# Patient Record
Sex: FEMALE | Race: ASIAN | Employment: UNEMPLOYED | URBAN - METROPOLITAN AREA
[De-identification: names, ages, dates, MRNs, and addresses within clinical notes are randomized per-mention and may not be internally consistent; named-entity substitution may affect disease eponyms.]

---

## 2021-03-17 ENCOUNTER — ANESTHESIA EVENT (OUTPATIENT)
Dept: OBGYN UNIT | Facility: HOSPITAL | Age: 31
End: 2021-03-17
Payer: COMMERCIAL

## 2021-03-17 ENCOUNTER — HOSPITAL ENCOUNTER (INPATIENT)
Facility: HOSPITAL | Age: 31
LOS: 3 days | Discharge: HOME | End: 2021-03-20
Attending: OBSTETRICS & GYNECOLOGY | Admitting: OBSTETRICS & GYNECOLOGY
Payer: COMMERCIAL

## 2021-03-17 ENCOUNTER — ANESTHESIA (OUTPATIENT)
Dept: OBGYN UNIT | Facility: HOSPITAL | Age: 31
End: 2021-03-17
Payer: COMMERCIAL

## 2021-03-17 PROBLEM — Z34.90 PREGNANCY (HCC): Status: ACTIVE | Noted: 2021-03-17

## 2021-03-17 PROBLEM — Z34.90 PREGNANCY: Status: ACTIVE | Noted: 2021-03-17

## 2021-03-17 LAB
ANTIBODY SCREEN: NEGATIVE
BASOPHILS # BLD AUTO: 0.05 X10(3) UL (ref 0–0.2)
BASOPHILS NFR BLD AUTO: 0.4 %
DEPRECATED RDW RBC AUTO: 44.8 FL (ref 35.1–46.3)
EOSINOPHIL # BLD AUTO: 0.21 X10(3) UL (ref 0–0.7)
EOSINOPHIL NFR BLD AUTO: 1.6 %
ERYTHROCYTE [DISTWIDTH] IN BLOOD BY AUTOMATED COUNT: 14.3 % (ref 11–15)
HCT VFR BLD AUTO: 36.6 %
HGB BLD-MCNC: 12.6 G/DL
IMM GRANULOCYTES # BLD AUTO: 0.41 X10(3) UL (ref 0–1)
IMM GRANULOCYTES NFR BLD: 3.1 %
LYMPHOCYTES # BLD AUTO: 1.74 X10(3) UL (ref 1–4)
LYMPHOCYTES NFR BLD AUTO: 13.3 %
MCH RBC QN AUTO: 30.2 PG (ref 26–34)
MCHC RBC AUTO-ENTMCNC: 34.4 G/DL (ref 31–37)
MCV RBC AUTO: 87.8 FL
MONOCYTES # BLD AUTO: 0.9 X10(3) UL (ref 0.1–1)
MONOCYTES NFR BLD AUTO: 6.9 %
NEUTROPHILS # BLD AUTO: 9.75 X10 (3) UL (ref 1.5–7.7)
NEUTROPHILS # BLD AUTO: 9.75 X10(3) UL (ref 1.5–7.7)
NEUTROPHILS NFR BLD AUTO: 74.7 %
PLATELET # BLD AUTO: 230 10(3)UL (ref 150–450)
RBC # BLD AUTO: 4.17 X10(6)UL
RH BLOOD TYPE: POSITIVE
SARS-COV-2 RNA RESP QL NAA+PROBE: NOT DETECTED
T PALLIDUM AB SER QL IA: NONREACTIVE
WBC # BLD AUTO: 13.1 X10(3) UL (ref 4–11)

## 2021-03-17 PROCEDURE — 85025 COMPLETE CBC W/AUTO DIFF WBC: CPT | Performed by: OBSTETRICS & GYNECOLOGY

## 2021-03-17 PROCEDURE — 86850 RBC ANTIBODY SCREEN: CPT | Performed by: OBSTETRICS & GYNECOLOGY

## 2021-03-17 PROCEDURE — 86901 BLOOD TYPING SEROLOGIC RH(D): CPT | Performed by: OBSTETRICS & GYNECOLOGY

## 2021-03-17 PROCEDURE — 99204 OFFICE O/P NEW MOD 45 MIN: CPT

## 2021-03-17 PROCEDURE — 86780 TREPONEMA PALLIDUM: CPT | Performed by: OBSTETRICS & GYNECOLOGY

## 2021-03-17 PROCEDURE — 86900 BLOOD TYPING SEROLOGIC ABO: CPT | Performed by: OBSTETRICS & GYNECOLOGY

## 2021-03-17 RX ORDER — TERBUTALINE SULFATE 1 MG/ML
0.25 INJECTION, SOLUTION SUBCUTANEOUS AS NEEDED
Status: DISCONTINUED | OUTPATIENT
Start: 2021-03-17 | End: 2021-03-18

## 2021-03-17 RX ORDER — DEXTROSE, SODIUM CHLORIDE, SODIUM LACTATE, POTASSIUM CHLORIDE, AND CALCIUM CHLORIDE 5; .6; .31; .03; .02 G/100ML; G/100ML; G/100ML; G/100ML; G/100ML
INJECTION, SOLUTION INTRAVENOUS CONTINUOUS
Status: DISCONTINUED | OUTPATIENT
Start: 2021-03-17 | End: 2021-03-18

## 2021-03-17 RX ORDER — VANCOMYCIN HYDROCHLORIDE
20 EVERY 8 HOURS
Status: DISCONTINUED | OUTPATIENT
Start: 2021-03-17 | End: 2021-03-18

## 2021-03-17 RX ORDER — ONDANSETRON 2 MG/ML
4 INJECTION INTRAMUSCULAR; INTRAVENOUS EVERY 6 HOURS PRN
Status: DISCONTINUED | OUTPATIENT
Start: 2021-03-17 | End: 2021-03-18

## 2021-03-17 RX ORDER — TRISODIUM CITRATE DIHYDRATE AND CITRIC ACID MONOHYDRATE 500; 334 MG/5ML; MG/5ML
30 SOLUTION ORAL AS NEEDED
Status: DISCONTINUED | OUTPATIENT
Start: 2021-03-17 | End: 2021-03-18

## 2021-03-17 RX ORDER — SODIUM CHLORIDE, SODIUM LACTATE, POTASSIUM CHLORIDE, CALCIUM CHLORIDE 600; 310; 30; 20 MG/100ML; MG/100ML; MG/100ML; MG/100ML
INJECTION, SOLUTION INTRAVENOUS AS NEEDED
Status: DISCONTINUED | OUTPATIENT
Start: 2021-03-17 | End: 2021-03-18

## 2021-03-17 RX ORDER — AMMONIA INHALANTS 0.04 G/.3ML
0.3 INHALANT RESPIRATORY (INHALATION) AS NEEDED
Status: DISCONTINUED | OUTPATIENT
Start: 2021-03-17 | End: 2021-03-18

## 2021-03-17 RX ORDER — IBUPROFEN 600 MG/1
600 TABLET ORAL EVERY 6 HOURS PRN
Status: DISCONTINUED | OUTPATIENT
Start: 2021-03-17 | End: 2021-03-18

## 2021-03-17 RX ORDER — ACETAMINOPHEN 500 MG
500 TABLET ORAL EVERY 6 HOURS PRN
Status: DISCONTINUED | OUTPATIENT
Start: 2021-03-17 | End: 2021-03-18

## 2021-03-17 RX ORDER — NALBUPHINE HCL 10 MG/ML
2.5 AMPUL (ML) INJECTION
Status: DISCONTINUED | OUTPATIENT
Start: 2021-03-17 | End: 2021-03-18

## 2021-03-17 RX ORDER — BUPIVACAINE HCL/0.9 % NACL/PF 0.25 %
5 PLASTIC BAG, INJECTION (ML) EPIDURAL AS NEEDED
Status: DISCONTINUED | OUTPATIENT
Start: 2021-03-17 | End: 2021-03-18

## 2021-03-17 NOTE — PROGRESS NOTES
Pt is a 27year old female admitted to TRG2/TRG2-A. Patient presents with:  R/o Labor     Pt is  38w0d intra-uterine pregnancy. History obtained, consents signed. Oriented to room, staff, and plan of care.

## 2021-03-18 PROBLEM — Z34.90 PREGNANT: Status: ACTIVE | Noted: 2021-03-18

## 2021-03-18 PROBLEM — Z34.90 PREGNANT (HCC): Status: ACTIVE | Noted: 2021-03-18

## 2021-03-18 PROCEDURE — 10907ZC DRAINAGE OF AMNIOTIC FLUID, THERAPEUTIC FROM PRODUCTS OF CONCEPTION, VIA NATURAL OR ARTIFICIAL OPENING: ICD-10-PCS | Performed by: OBSTETRICS & GYNECOLOGY

## 2021-03-18 PROCEDURE — 0KQM0ZZ REPAIR PERINEUM MUSCLE, OPEN APPROACH: ICD-10-PCS | Performed by: OBSTETRICS & GYNECOLOGY

## 2021-03-18 RX ORDER — BISACODYL 10 MG
10 SUPPOSITORY, RECTAL RECTAL ONCE AS NEEDED
Status: DISCONTINUED | OUTPATIENT
Start: 2021-03-18 | End: 2021-03-20

## 2021-03-18 RX ORDER — ACETAMINOPHEN 325 MG/1
650 TABLET ORAL EVERY 6 HOURS PRN
Status: DISCONTINUED | OUTPATIENT
Start: 2021-03-18 | End: 2021-03-20

## 2021-03-18 RX ORDER — SIMETHICONE 80 MG
80 TABLET,CHEWABLE ORAL 3 TIMES DAILY PRN
Status: DISCONTINUED | OUTPATIENT
Start: 2021-03-18 | End: 2021-03-20

## 2021-03-18 RX ORDER — DOCUSATE SODIUM 100 MG/1
100 CAPSULE, LIQUID FILLED ORAL
Status: DISCONTINUED | OUTPATIENT
Start: 2021-03-18 | End: 2021-03-20

## 2021-03-18 RX ORDER — IBUPROFEN 600 MG/1
600 TABLET ORAL EVERY 6 HOURS
Status: DISCONTINUED | OUTPATIENT
Start: 2021-03-18 | End: 2021-03-20

## 2021-03-18 NOTE — H&P
Pt is 28 y/o G1 at 38w1d who presented with c/o ctx's - was found to be 3 cm dilated - admitted for IV ABX for + GBS - then given epidural    PMH - 1 hour - nl              + gBS  /68   Pulse 90   Resp 16   Ht 5' (1.524 m)   Wt 151 lb (68.5 kg)   LMP

## 2021-03-18 NOTE — PROGRESS NOTES
Patient up to bathroom with assist x 1. Unable to void at this time, straight cath for 300 ml of urine per RN. Patient transferred to mother/baby room 1116 per wheelchair in stable condition with baby and personal belongings.   Accompanied by significant ot

## 2021-03-18 NOTE — PLAN OF CARE
Problem: BIRTH - VAGINAL/ SECTION  Goal: Fetal and maternal status remain reassuring during the birth process  Description: INTERVENTIONS:  - Monitor vital signs  - Monitor fetal heart rate  - Monitor uterine activity  - Monitor labor progression less  Multidisciplinary care   Nonpharmacologic comfort measures       Outcome: Completed

## 2021-03-18 NOTE — CONSULTS
[facility] Dosing Service    Vancomycin for GBS (+) patient  Janina Gallardo is a 27year old patient who has been prescribed vancomycin 20mg/kg IV every 8 hours for GBS prophylaxis.   Pharmacy will monitor and will order vancomycin levels if medication is

## 2021-03-18 NOTE — L&D DELIVERY NOTE
Anibal Mauro, Girl [[id number]]    Labor Events     labor?: No   steroids?: None  Antibiotics received during labor?: Yes  Antibiotics (enter # doses in comment): other (Comment: vancomycin x1)  Rupture date/time: 3/17/2021 2218     Rupture type: A Absent <100 bpm >100 bpm    Reflex irritability No response Grimace Cry or active withdrawal    Muscle tone Limp Some flexion Active motion    Respiratory effort Absent Weak cry; hypoventilation Good, crying              1 Minute:  5 Minute:  10 Minute:  1

## 2021-03-18 NOTE — PROGRESS NOTES
Pt now pushing - labored down - now +2/+3 - head visible with pushing  FHT's - moderate variability - small variables  A/P Baby stable - allow to push

## 2021-03-18 NOTE — ANESTHESIA PREPROCEDURE EVALUATION
PRE-OP EVALUATION    Patient Name: No Luis    Pre-op Diagnosis: * No surgery found *    * No surgery found *    * Surgery not found *    Pre-op vitals reviewed. Pulse: 96  Resp: 16  BP: 127/78     Body mass index is 29.49 kg/m².     Current medicatio cardiovascular ROS. Endo/Other    Negative endo/other ROS. Pulmonary    Negative pulmonary ROS. Neuro/Psych    Negative neuro/psych ROS.

## 2021-03-19 LAB
BASOPHILS # BLD AUTO: 0.06 X10(3) UL (ref 0–0.2)
BASOPHILS NFR BLD AUTO: 0.4 %
DEPRECATED RDW RBC AUTO: 47.2 FL (ref 35.1–46.3)
EOSINOPHIL # BLD AUTO: 0.44 X10(3) UL (ref 0–0.7)
EOSINOPHIL NFR BLD AUTO: 3.3 %
ERYTHROCYTE [DISTWIDTH] IN BLOOD BY AUTOMATED COUNT: 14.6 % (ref 11–15)
HCT VFR BLD AUTO: 26.5 %
HGB BLD-MCNC: 8.7 G/DL
IMM GRANULOCYTES # BLD AUTO: 0.31 X10(3) UL (ref 0–1)
IMM GRANULOCYTES NFR BLD: 2.3 %
LYMPHOCYTES # BLD AUTO: 2.16 X10(3) UL (ref 1–4)
LYMPHOCYTES NFR BLD AUTO: 16.2 %
MCH RBC QN AUTO: 29.7 PG (ref 26–34)
MCHC RBC AUTO-ENTMCNC: 32.8 G/DL (ref 31–37)
MCV RBC AUTO: 90.4 FL
MONOCYTES # BLD AUTO: 0.99 X10(3) UL (ref 0.1–1)
MONOCYTES NFR BLD AUTO: 7.4 %
NEUTROPHILS # BLD AUTO: 9.4 X10 (3) UL (ref 1.5–7.7)
NEUTROPHILS # BLD AUTO: 9.4 X10(3) UL (ref 1.5–7.7)
NEUTROPHILS NFR BLD AUTO: 70.4 %
PLATELET # BLD AUTO: 159 10(3)UL (ref 150–450)
RBC # BLD AUTO: 2.93 X10(6)UL
WBC # BLD AUTO: 13.4 X10(3) UL (ref 4–11)

## 2021-03-19 PROCEDURE — 85025 COMPLETE CBC W/AUTO DIFF WBC: CPT | Performed by: OBSTETRICS & GYNECOLOGY

## 2021-03-19 NOTE — PROGRESS NOTES
OB Progress Note PPD#1    S: Feels well. Ambulating, eating. Pain controlled. Moderate VB. Breastfeeding. Voiding without difficulty, + flatus, no BM  O:   Blood pressure 93/56, pulse 79, temperature 97.7 °F (36.5 °C), temperature source Oral, resp.  rate

## 2021-03-19 NOTE — PROGRESS NOTES
Labor Analgesia Follow Up Note    Patient underwent epidural anesthesia for labor analgesia,    Placenta Date/Time: 3/18/2021  2:35 AM    Delivery Date/Time[de-identified] 3/18/2021  2:31 AM    BP 93/56 (BP Location: Right arm)   Pulse 79   Temp 97.7 °F (36.5 °C) (Oral

## 2021-03-20 VITALS
HEIGHT: 60 IN | RESPIRATION RATE: 18 BRPM | DIASTOLIC BLOOD PRESSURE: 59 MMHG | HEART RATE: 89 BPM | BODY MASS INDEX: 29.64 KG/M2 | SYSTOLIC BLOOD PRESSURE: 107 MMHG | WEIGHT: 151 LBS | TEMPERATURE: 98 F | OXYGEN SATURATION: 98 %

## 2021-03-20 RX ORDER — IBUPROFEN 600 MG/1
600 TABLET ORAL EVERY 6 HOURS PRN
Qty: 40 TABLET | Refills: 0 | Status: SHIPPED | OUTPATIENT
Start: 2021-03-20 | End: 2021-04-29 | Stop reason: ALTCHOICE

## 2021-03-20 NOTE — PROGRESS NOTES
OB Progress Note PPD#2    S: Feels well. Ambulating, eating. Pain controlled. Moderate VB. Breastfeeding. Voiding without difficulty, + flatus, + BM  O:   Blood pressure 107/59, pulse 89, temperature 97.5 °F (36.4 °C), temperature source Oral, resp.  rate

## 2021-03-22 ENCOUNTER — TELEPHONE (OUTPATIENT)
Dept: OBGYN UNIT | Facility: HOSPITAL | Age: 31
End: 2021-03-22

## 2021-03-22 NOTE — PROGRESS NOTES
03/22/21 1108   Cradle Call Follow up   Cradle call follow up date 03/22/21   Follow up needed Completed   Hypertension or Preeclampsia during pregnancy or delivery No   Outgoing Cradle Call completed: Mom reports that she and infant are doing well.

## 2021-05-21 ENCOUNTER — NURSE ONLY (OUTPATIENT)
Dept: LACTATION | Facility: HOSPITAL | Age: 31
End: 2021-05-21
Attending: OBSTETRICS & GYNECOLOGY
Payer: COMMERCIAL

## 2021-05-21 VITALS — TEMPERATURE: 98 F

## 2021-05-21 PROCEDURE — 99213 OFFICE O/P EST LOW 20 MIN: CPT

## 2021-12-31 NOTE — ANESTHESIA PROCEDURE NOTES
Labor Analgesia  Performed by: Ryan Simmons MD  Authorized by: Ryan Simmons MD       General Information and Staff    Start Time:  3/17/2021 8:45 PM  End Time:  3/17/2021 9:07 PM  Anesthesiologist:  Ryan Simmons MD  Performed by:   Anesthesio unknown

## 2022-05-23 LAB
ANTIBODY SCREEN OB: NEGATIVE
HEPATITIS B SURFACE ANTIGEN OB: NEGATIVE
HIV RESULT OB: NEGATIVE
RAPID PLASMA REAGIN OB: NONREACTIVE
RH FACTOR OB: POSITIVE

## 2022-09-09 ENCOUNTER — OFFICE VISIT (OUTPATIENT)
Dept: PERINATAL CARE | Facility: HOSPITAL | Age: 32
End: 2022-09-09
Attending: OBSTETRICS & GYNECOLOGY
Payer: COMMERCIAL

## 2022-09-09 VITALS
HEART RATE: 85 BPM | BODY MASS INDEX: 27.09 KG/M2 | DIASTOLIC BLOOD PRESSURE: 77 MMHG | WEIGHT: 138 LBS | HEIGHT: 60 IN | SYSTOLIC BLOOD PRESSURE: 114 MMHG

## 2022-09-09 DIAGNOSIS — Z86.16 HISTORY OF COVID-19: Primary | ICD-10-CM

## 2022-09-09 DIAGNOSIS — O98.512 COVID-19 AFFECTING PREGNANCY IN SECOND TRIMESTER: ICD-10-CM

## 2022-09-09 DIAGNOSIS — U07.1 COVID-19 AFFECTING PREGNANCY IN SECOND TRIMESTER: ICD-10-CM

## 2022-09-09 DIAGNOSIS — Z86.16 HISTORY OF COVID-19: ICD-10-CM

## 2022-09-09 DIAGNOSIS — O28.3 ECHOGENIC INTRACARDIAC FOCUS OF FETUS ON PRENATAL ULTRASOUND: ICD-10-CM

## 2022-09-09 PROCEDURE — 76811 OB US DETAILED SNGL FETUS: CPT | Performed by: OBSTETRICS & GYNECOLOGY

## 2022-09-09 RX ORDER — CHOLECALCIFEROL (VITAMIN D3) 25 MCG
1 TABLET,CHEWABLE ORAL DAILY
COMMUNITY

## 2022-11-02 LAB
HIV RESULT OB: NEGATIVE
STREP GP B CULT OB: POSITIVE

## 2022-12-19 ENCOUNTER — TELEPHONE (OUTPATIENT)
Dept: OBGYN UNIT | Facility: HOSPITAL | Age: 32
End: 2022-12-19

## 2022-12-22 ENCOUNTER — TELEPHONE (OUTPATIENT)
Dept: OBGYN UNIT | Facility: HOSPITAL | Age: 32
End: 2022-12-22

## 2022-12-22 ENCOUNTER — HOSPITAL ENCOUNTER (INPATIENT)
Facility: HOSPITAL | Age: 32
LOS: 2 days | Discharge: HOME | End: 2022-12-24
Attending: OBSTETRICS & GYNECOLOGY | Admitting: OBSTETRICS & GYNECOLOGY
Payer: COMMERCIAL

## 2022-12-22 ENCOUNTER — ANESTHESIA EVENT (OUTPATIENT)
Dept: OBGYN UNIT | Facility: HOSPITAL | Age: 32
End: 2022-12-22
Payer: COMMERCIAL

## 2022-12-22 ENCOUNTER — ANESTHESIA (OUTPATIENT)
Dept: OBGYN UNIT | Facility: HOSPITAL | Age: 32
End: 2022-12-22
Payer: COMMERCIAL

## 2022-12-22 LAB
ANTIBODY SCREEN: NEGATIVE
BASOPHILS # BLD AUTO: 0.06 X10(3) UL (ref 0–0.2)
BASOPHILS NFR BLD AUTO: 0.5 %
EOSINOPHIL # BLD AUTO: 0.15 X10(3) UL (ref 0–0.7)
EOSINOPHIL NFR BLD AUTO: 1.2 %
ERYTHROCYTE [DISTWIDTH] IN BLOOD BY AUTOMATED COUNT: 14.6 %
HCT VFR BLD AUTO: 37 %
HGB BLD-MCNC: 11.8 G/DL
IMM GRANULOCYTES # BLD AUTO: 0.14 X10(3) UL (ref 0–1)
IMM GRANULOCYTES NFR BLD: 1.2 %
LYMPHOCYTES # BLD AUTO: 1.83 X10(3) UL (ref 1–4)
LYMPHOCYTES NFR BLD AUTO: 15.1 %
MCH RBC QN AUTO: 27.1 PG (ref 26–34)
MCHC RBC AUTO-ENTMCNC: 31.9 G/DL (ref 31–37)
MCV RBC AUTO: 84.9 FL
MONOCYTES # BLD AUTO: 0.96 X10(3) UL (ref 0.1–1)
MONOCYTES NFR BLD AUTO: 7.9 %
NEUTROPHILS # BLD AUTO: 8.94 X10 (3) UL (ref 1.5–7.7)
NEUTROPHILS # BLD AUTO: 8.94 X10(3) UL (ref 1.5–7.7)
NEUTROPHILS NFR BLD AUTO: 74.1 %
PLATELET # BLD AUTO: 238 10(3)UL (ref 150–450)
RBC # BLD AUTO: 4.36 X10(6)UL
RH BLOOD TYPE: POSITIVE
SARS-COV-2 RNA RESP QL NAA+PROBE: NOT DETECTED
T PALLIDUM AB SER QL IA: NONREACTIVE
WBC # BLD AUTO: 12.1 X10(3) UL (ref 4–11)

## 2022-12-22 PROCEDURE — 99214 OFFICE O/P EST MOD 30 MIN: CPT

## 2022-12-22 PROCEDURE — 86900 BLOOD TYPING SEROLOGIC ABO: CPT | Performed by: OBSTETRICS & GYNECOLOGY

## 2022-12-22 PROCEDURE — 86780 TREPONEMA PALLIDUM: CPT | Performed by: OBSTETRICS & GYNECOLOGY

## 2022-12-22 PROCEDURE — 85025 COMPLETE CBC W/AUTO DIFF WBC: CPT | Performed by: OBSTETRICS & GYNECOLOGY

## 2022-12-22 PROCEDURE — 86901 BLOOD TYPING SEROLOGIC RH(D): CPT | Performed by: OBSTETRICS & GYNECOLOGY

## 2022-12-22 PROCEDURE — 86850 RBC ANTIBODY SCREEN: CPT | Performed by: OBSTETRICS & GYNECOLOGY

## 2022-12-22 PROCEDURE — 0KQM0ZZ REPAIR PERINEUM MUSCLE, OPEN APPROACH: ICD-10-PCS | Performed by: OBSTETRICS & GYNECOLOGY

## 2022-12-22 RX ORDER — IBUPROFEN 600 MG/1
600 TABLET ORAL EVERY 6 HOURS PRN
Status: DISCONTINUED | OUTPATIENT
Start: 2022-12-22 | End: 2022-12-22

## 2022-12-22 RX ORDER — SODIUM CHLORIDE, SODIUM LACTATE, POTASSIUM CHLORIDE, CALCIUM CHLORIDE 600; 310; 30; 20 MG/100ML; MG/100ML; MG/100ML; MG/100ML
INJECTION, SOLUTION INTRAVENOUS CONTINUOUS
Status: DISCONTINUED | OUTPATIENT
Start: 2022-12-22 | End: 2022-12-22

## 2022-12-22 RX ORDER — CLINDAMYCIN PHOSPHATE 900 MG/50ML
900 INJECTION INTRAVENOUS EVERY 8 HOURS
Status: DISCONTINUED | OUTPATIENT
Start: 2022-12-22 | End: 2022-12-22

## 2022-12-22 RX ORDER — BUPIVACAINE HCL/0.9 % NACL/PF 0.25 %
5 PLASTIC BAG, INJECTION (ML) EPIDURAL AS NEEDED
Status: DISCONTINUED | OUTPATIENT
Start: 2022-12-22 | End: 2022-12-22

## 2022-12-22 RX ORDER — TRISODIUM CITRATE DIHYDRATE AND CITRIC ACID MONOHYDRATE 500; 334 MG/5ML; MG/5ML
30 SOLUTION ORAL AS NEEDED
Status: DISCONTINUED | OUTPATIENT
Start: 2022-12-22 | End: 2022-12-22

## 2022-12-22 RX ORDER — SIMETHICONE 80 MG
80 TABLET,CHEWABLE ORAL 3 TIMES DAILY PRN
Status: DISCONTINUED | OUTPATIENT
Start: 2022-12-22 | End: 2022-12-24

## 2022-12-22 RX ORDER — NALBUPHINE HCL 10 MG/ML
2.5 AMPUL (ML) INJECTION
Status: DISCONTINUED | OUTPATIENT
Start: 2022-12-22 | End: 2022-12-22

## 2022-12-22 RX ORDER — TERBUTALINE SULFATE 1 MG/ML
0.25 INJECTION, SOLUTION SUBCUTANEOUS AS NEEDED
Status: DISCONTINUED | OUTPATIENT
Start: 2022-12-22 | End: 2022-12-22

## 2022-12-22 RX ORDER — AMMONIA INHALANTS 0.04 G/.3ML
0.3 INHALANT RESPIRATORY (INHALATION) AS NEEDED
Status: DISCONTINUED | OUTPATIENT
Start: 2022-12-22 | End: 2022-12-22

## 2022-12-22 RX ORDER — ACETAMINOPHEN 500 MG
500 TABLET ORAL EVERY 6 HOURS PRN
Status: DISCONTINUED | OUTPATIENT
Start: 2022-12-22 | End: 2022-12-24

## 2022-12-22 RX ORDER — ONDANSETRON 2 MG/ML
4 INJECTION INTRAMUSCULAR; INTRAVENOUS EVERY 6 HOURS PRN
Status: DISCONTINUED | OUTPATIENT
Start: 2022-12-22 | End: 2022-12-22

## 2022-12-22 RX ORDER — LIDOCAINE HYDROCHLORIDE AND EPINEPHRINE 15; 5 MG/ML; UG/ML
INJECTION, SOLUTION EPIDURAL AS NEEDED
Status: DISCONTINUED | OUTPATIENT
Start: 2022-12-22 | End: 2022-12-22 | Stop reason: SURG

## 2022-12-22 RX ORDER — DEXTROSE, SODIUM CHLORIDE, SODIUM LACTATE, POTASSIUM CHLORIDE, AND CALCIUM CHLORIDE 5; .6; .31; .03; .02 G/100ML; G/100ML; G/100ML; G/100ML; G/100ML
INJECTION, SOLUTION INTRAVENOUS AS NEEDED
Status: DISCONTINUED | OUTPATIENT
Start: 2022-12-22 | End: 2022-12-22

## 2022-12-22 RX ORDER — BISACODYL 10 MG
10 SUPPOSITORY, RECTAL RECTAL ONCE AS NEEDED
Status: DISCONTINUED | OUTPATIENT
Start: 2022-12-22 | End: 2022-12-24

## 2022-12-22 RX ORDER — ACETAMINOPHEN 500 MG
500 TABLET ORAL EVERY 6 HOURS PRN
Status: DISCONTINUED | OUTPATIENT
Start: 2022-12-22 | End: 2022-12-22

## 2022-12-22 RX ORDER — DOCUSATE SODIUM 100 MG/1
100 CAPSULE, LIQUID FILLED ORAL
Status: DISCONTINUED | OUTPATIENT
Start: 2022-12-22 | End: 2022-12-24

## 2022-12-22 RX ORDER — IBUPROFEN 600 MG/1
600 TABLET ORAL EVERY 6 HOURS
Status: DISCONTINUED | OUTPATIENT
Start: 2022-12-22 | End: 2022-12-24

## 2022-12-22 RX ORDER — ACETAMINOPHEN 500 MG
1000 TABLET ORAL EVERY 6 HOURS PRN
Status: DISCONTINUED | OUTPATIENT
Start: 2022-12-22 | End: 2022-12-24

## 2022-12-22 RX ORDER — LIDOCAINE HYDROCHLORIDE 10 MG/ML
INJECTION, SOLUTION EPIDURAL; INFILTRATION; INTRACAUDAL; PERINEURAL AS NEEDED
Status: DISCONTINUED | OUTPATIENT
Start: 2022-12-22 | End: 2022-12-22 | Stop reason: SURG

## 2022-12-22 RX ADMIN — LIDOCAINE HYDROCHLORIDE 3 ML: 10 INJECTION, SOLUTION EPIDURAL; INFILTRATION; INTRACAUDAL; PERINEURAL at 12:52:00

## 2022-12-22 RX ADMIN — LIDOCAINE HYDROCHLORIDE AND EPINEPHRINE 3 ML: 15; 5 INJECTION, SOLUTION EPIDURAL at 12:56:00

## 2022-12-22 NOTE — L&D DELIVERY NOTE
Jony Man, Girl [[id number]]    Labor Events     labor?: No   steroids?: None  Antibiotics received during labor?: Yes  Antibiotics (enter # doses in comment): other (Comment: Clindamyacin)  Rupture date/time: 2022 1040     Rupture type: SROM  Fluid color: Clear  Induction: None  Augmentation: Oxytocin  Indications for augmentation: Ineffective Contraction Pattern  Intrapartum & labor complications: Group B beta strep +     Labor Event Times    Labor onset date/time: 2022 0400  Dilation complete date/time: 2022 1437      Presentation    Presentation: Vertex  Position: Right Occiput Anterior     Operative Delivery    Operative Vaginal Delivery: No            Shoulder Dystocia    Shoulder Dystocia: No     Anesthesia    Method: Epidural          [place] Delivery    Head delivery date/time: 2022 14:49:19   Delivery date/time:  22 14:49:38   Delivery type: Normal spontaneous vaginal delivery    Details:     Delivery location: delivery room     Delivery Providers    Delivering Clinician: Brittany Reardon MD   Delivery personnel:  Provider Role   Nicole Rey RN Baby Nurse   Jennifer Wheeler RN Delivery Nurse         Cord    Vessels: 3 Vessels  Complications: None  Timed cord clamping: Yes  Time in sec: 30  Cord blood disposition: to lab  Gases sent?: No     Resuscitation    Method: Oxygen      Measurements    Weight: 3020 g 6 lb 10.5 oz Length: 48.3 cm   Head circum. : 34 cm Chest circum.: 31 cm      Abdominal circum.: 31 cm       Placenta    Date/time: 2022 1453  Removal: Spontaneous  Appearance: Intact  Disposition: held for future pathology     Apgars    Living status: Living   Apgar Scoring Key:    0 1 2    Skin color Blue or pale Acrocyanotic Completely pink    Heart rate Absent <100 bpm >100 bpm    Reflex irritability No response Grimace Cry or active withdrawal    Muscle tone Limp Some flexion Active motion    Respiratory effort Absent Weak cry; hypoventilation Good, crying              1 Minute:  5 Minute:  10 Minute:  15 Minute:  20 Minute:    Skin color: 1  1       Heart rate: 2  2       Reflex irritablity: 2  2       Muscle tone: 2  2       Respiratory effort: 2  2       Total: 9  9          Apgars assigned by: GAIL PULLIAM   disposition: with mother     Skin to Skin    No data filed     Vaginal Count    Initial count RN: Justin Tompkins RN  Initial count Tech: Elizabeth Sorrel S   Sponges   Sharps    Initial counts 11   0    Final counts 11   2    Final count RN: Justin Tompkins RN  Final count MD: Reid Cali MD     Delivery (Maternal)    Episiotomy: None  Perineal lacerations: 2nd Repaired?: Yes   Vaginal laceration?: No    Cervical laceration?: No    Clitoral laceration?: No                                                                      Vaginal Delivery Note          Inés Motjeff Patient Status:  Inpatient    1990 MRN [id number]   Location [facility] Attending Terryann Merlin, MD   Hosp Day # 0 PCP No primary care provider on file. Pre Op Dx:  IUP at 40 0/7 weeks    Post Op Dx: Same    Procedure: Normal Spontaneous Vaginal Delivery    Surgeon: Aurelia Lerma    Anesthesia: Epidural    EBL: 442cc    Findings: 1) A viable female infant with Apgars of 9 and 9 weighing 6lbs 11 oz was delivered in the MIRELLA position. 2) 3 vessel cord. 3)Normal appearing placenta spontaneously delivered. 4)second degree laceration    Procedure:  Patient is a 33y/o   who presented at 40 weeks gestation complaining of labor and had SROM in office. Patient was admitted to labor and delivery. Her labor progressed and upon complete dilation she had a strong urge to push and so was encouraged to do so. Patient pushed for approximately 5mins at which time the head was . As the head was delivered the legs were lowered and the perineum was supported to decrease the risk of tearing.   Infant was delivered in the [facility] position. The infants mouth and nose were bulb suctioned. The shoulders rotated easily and the anterior shoulder delivered easily followed by the posterior shoulder and remainder of the infant. The infant was dried and suctioned. The cord was doubly clamped and cut after 30secs. The baby was placed on the mothers abdomen vigorous and crying. The placenta spontaneously delivered intact shortly thereafter. Examination of the cervix, vagina, and perineum demonstrated a second degree laceration. The vaginal laceration was repaired using 2-0 vicryl rapide in a running locked fashion. A deep crown stitch was placed and the perineal body was re-approximated using 2-0 vicryl rapide in an interupted fashion. The skin was re-approximated using 3-0 vicryl rapide. A recto-vaginal exam was normal and bleeding was minimal.  The patient was then moved to the supine position in stable condition. Counts were correct.     Complications:  None    Lex Sharma MD  12/22/2022  3:05 PM

## 2022-12-22 NOTE — ANESTHESIA PROCEDURE NOTES
Labor Analgesia    Date/Time: 12/22/2022 12:52 PM  Performed by: Stephanie Arroyo MD  Authorized by: Stephanie Arroyo MD       General Information and Staff    Start Time:  12/22/2022 12:52 PM  End Time:  12/22/2022 12:56 PM  Anesthesiologist:  Stephanie Arroyo MD  Performed by:   Anesthesiologist  Patient Location:  OB  Site Identification: surface landmarks    Reason for Block: labor epidural    Preanesthetic Checklist: patient identified, IV checked, risks and benefits discussed, monitors and equipment checked, pre-op evaluation, timeout performed, IV bolus, anesthesia consent and sterile technique used      Procedure Details    Patient Position:  Sitting  Prep: ChloraPrep    Monitoring:  Heart rate and continuous pulse ox  Approach:  Midline    Epidural Needle    Injection Technique:  KARLA saline  Needle Type:  Tuohy  Needle Gauge:  17 G  Needle Length:  3.375 in  Needle Insertion Depth:  5  Location:  L3-4    Spinal Needle      Catheter    Catheter Type:  End hole  Catheter Size:  19 G  Catheter at Skin Depth:  9  Test Dose:  Negative    Assessment      Additional Comments

## 2022-12-22 NOTE — PROGRESS NOTES
Pt transferred to [facility] in room 1108. Vital signs stable on transfer. All belongings sent with patient. Baby ID bands matched and verified with parents.  Report given to [facility].

## 2022-12-22 NOTE — PLAN OF CARE
Problem: BIRTH - VAGINAL/ SECTION  Goal: Fetal and maternal status remain reassuring during the birth process  Description: INTERVENTIONS:  - Monitor vital signs  - Monitor fetal heart rate  - Monitor uterine activity  - Monitor labor progression (vaginal delivery)  - DVT prophylaxis (C/S delivery)  - Surgical antibiotic prophylaxis (C/S delivery)  Outcome: Progressing     Problem: PAIN - ADULT  Goal: Verbalizes/displays adequate comfort level or patient's stated pain goal  Description: INTERVENTIONS:  - Encourage pt to monitor pain and request assistance  - Assess pain using appropriate pain scale  - Administer analgesics based on type and severity of pain and evaluate response  - Implement non-pharmacological measures as appropriate and evaluate response  - Consider cultural and social influences on pain and pain management  - Manage/alleviate anxiety  - Utilize distraction and/or relaxation techniques  - Monitor for opioid side effects  - Notify MD/LIP if interventions unsuccessful or patient reports new pain  - Anticipate increased pain with activity and pre-medicate as appropriate  Outcome: Progressing     Problem: ANXIETY  Goal: Will report anxiety at manageable levels  Description: INTERVENTIONS:  - Administer medication as ordered  - Teach and rehearse alternative coping skills  - Provide emotional support with 1:1 interaction with staff  Outcome: Progressing     Problem: Patient/Family Goals  Goal: Patient/Family Long Term Goal  Description: Patient's Long Term Goal: progress toward     Interventions:  -   - See additional Care Plan goals for specific interventions  Outcome: Progressing  Goal: Patient/Family Short Term Goal  Description: Patient's Short Term Goal: adequate pain maangement    Interventions:   -   - See additional Care Plan goals for specific interventions  Outcome: Progressing

## 2022-12-22 NOTE — PROGRESS NOTES
POC discussed with pt including options discussed with pt. Pt desires to ambulate for another hour and then make a decision.

## 2022-12-22 NOTE — PROGRESS NOTES
Nursing admission note    Pt admitted via wheelchair  ID bands verified  Oriented to room  Safety precautions are initiated  Bed in low position  Teaching initiated  Call light within reach. Nursing admission note

## 2022-12-22 NOTE — PROGRESS NOTES
Pt admitted to Trg 4 with c/o contractions since 0400. Denies any vaginal bleeding or LOF. Reports +FM. Rates UC pain 3/10. POC reviewed with pt. Pt in agreement.

## 2022-12-23 PROBLEM — Z34.90 PREGNANCY: Status: RESOLVED | Noted: 2021-03-17 | Resolved: 2022-12-23

## 2022-12-23 PROBLEM — Z34.90 PREGNANCY (HCC): Status: RESOLVED | Noted: 2021-03-17 | Resolved: 2022-12-23

## 2022-12-23 LAB
BASOPHILS # BLD AUTO: 0.04 X10(3) UL (ref 0–0.2)
BASOPHILS NFR BLD AUTO: 0.3 %
EOSINOPHIL # BLD AUTO: 0.17 X10(3) UL (ref 0–0.7)
EOSINOPHIL NFR BLD AUTO: 1.3 %
ERYTHROCYTE [DISTWIDTH] IN BLOOD BY AUTOMATED COUNT: 14.9 %
HCT VFR BLD AUTO: 28.2 %
HGB BLD-MCNC: 8.9 G/DL
IMM GRANULOCYTES # BLD AUTO: 0.14 X10(3) UL (ref 0–1)
IMM GRANULOCYTES NFR BLD: 1.1 %
LYMPHOCYTES # BLD AUTO: 2.15 X10(3) UL (ref 1–4)
LYMPHOCYTES NFR BLD AUTO: 16.5 %
MCH RBC QN AUTO: 27.3 PG (ref 26–34)
MCHC RBC AUTO-ENTMCNC: 31.6 G/DL (ref 31–37)
MCV RBC AUTO: 86.5 FL
MONOCYTES # BLD AUTO: 1.03 X10(3) UL (ref 0.1–1)
MONOCYTES NFR BLD AUTO: 7.9 %
NEUTROPHILS # BLD AUTO: 9.47 X10 (3) UL (ref 1.5–7.7)
NEUTROPHILS # BLD AUTO: 9.47 X10(3) UL (ref 1.5–7.7)
NEUTROPHILS NFR BLD AUTO: 72.9 %
PLATELET # BLD AUTO: 186 10(3)UL (ref 150–450)
RBC # BLD AUTO: 3.26 X10(6)UL
WBC # BLD AUTO: 13 X10(3) UL (ref 4–11)

## 2022-12-23 PROCEDURE — 85025 COMPLETE CBC W/AUTO DIFF WBC: CPT | Performed by: OBSTETRICS & GYNECOLOGY

## 2022-12-23 RX ORDER — IBUPROFEN 600 MG/1
600 TABLET ORAL EVERY 6 HOURS PRN
Qty: 60 TABLET | Refills: 0 | Status: SHIPPED | OUTPATIENT
Start: 2022-12-23

## 2022-12-23 NOTE — DISCHARGE INSTRUCTIONS
For the next six weeks:  -Nothing in the vagina (no sex, no tampons)  -Do not drive while taking Norco    Call the office for:  -Pain not relieved by pain medication  -Bleeding that soaks more than one pad per hour  -Fever >100.5  -Uncontrollable sadness or crying

## 2022-12-23 NOTE — PLAN OF CARE
Problem: BIRTH - VAGINAL/ SECTION  Goal: Fetal and maternal status remain reassuring during the birth process  Description: INTERVENTIONS:  - Monitor vital signs  - Monitor fetal heart rate  - Monitor uterine activity  - Monitor labor progression (vaginal delivery)  - DVT prophylaxis (C/S delivery)  - Surgical antibiotic prophylaxis (C/S delivery)  Outcome: Completed     Problem: PAIN - ADULT  Goal: Verbalizes/displays adequate comfort level or patient's stated pain goal  Description: INTERVENTIONS:  - Encourage pt to monitor pain and request assistance  - Assess pain using appropriate pain scale  - Administer analgesics based on type and severity of pain and evaluate response  - Implement non-pharmacological measures as appropriate and evaluate response  - Consider cultural and social influences on pain and pain management  - Manage/alleviate anxiety  - Utilize distraction and/or relaxation techniques  - Monitor for opioid side effects  - Notify MD/LIP if interventions unsuccessful or patient reports new pain  - Anticipate increased pain with activity and pre-medicate as appropriate  Outcome: Completed     Problem: ANXIETY  Goal: Will report anxiety at manageable levels  Description: INTERVENTIONS:  - Administer medication as ordered  - Teach and rehearse alternative coping skills  - Provide emotional support with 1:1 interaction with staff  Outcome: Completed    Problem: SAFETY ADULT - FALL  Goal: Free from fall injury  Description: INTERVENTIONS:  - Assess pt frequently for physical needs  - Identify cognitive and physical deficits and behaviors that affect risk of falls.   - [place] fall precautions as indicated by assessment.  - Educate pt/family on patient safety including physical limitations  - Instruct pt to call for assistance with activity based on assessment  - Modify environment to reduce risk of injury  - Provide assistive devices as appropriate  - Consider OT/PT consult to assist with strengthening/mobility  - Encourage toileting schedule  Outcome: Completed     Problem: POSTPARTUM  Goal: Long Term Goal:Experiences normal postpartum course  Description: INTERVENTIONS:  - Assess and monitor vital signs and lab values. - Assess fundus and lochia. - Provide ice/sitz baths for perineum discomfort. - Monitor healing of incision/episiotomy/laceration, and assess for signs and symptoms of infection and hematoma. - Assess bladder function and monitor for bladder distention.  - Provide/instruct/assist with pericare as needed. - Provide VTE prophylaxis as needed. - Monitor bowel function.  - Encourage ambulation and provide assistance as needed. - Assess and monitor emotional status and provide social service/psych resources as needed. - Utilize standard precautions and use personal protective equipment as indicated. Ensure aseptic care of all intravenous lines and invasive tubes/drains.  - Obtain immunization and exposure to communicable diseases history. Outcome: Completed  Goal: Optimize infant feeding at the breast  Description: INTERVENTIONS:  - Initiate breast feeding within first hour after birth. - Monitor effectiveness of current breast feeding efforts. - Assess support systems available to mother/family.  - Identify cultural beliefs/practices regarding lactation, letdown techniques, maternal food preferences. - Assess mother's knowledge and previous experience with breast feeding.  - Provide information as needed about early infant feeding cues (e.g., rooting, lip smacking, sucking fingers/hand) versus late cue of crying.  - Discuss/demonstrate breast feeding aids (e.g., infant sling, nursing footstool/pillows, and breast pumps). - Encourage mother/other family members to express feelings/concerns, and actively listen. - Educate father/SO about benefits of breast feeding and how to manage common lactation challenges.   - Recommend avoidance of specific medications or substances incompatible with breast feeding.  - Assess and monitor for signs of nipple pain/trauma. - Instruct and provide assistance with proper latch. - Review techniques for milk expression (breast pumping) and storage of breast milk. Provide pumping equipment/supplies, instructions and assistance, as needed. - Encourage rooming-in and breast feeding on demand.  - Encourage skin-to-skin contact. - Provide LC support as needed. - Assess for and manage engorgement. - Provide breast feeding education handouts and information on community breast feeding support. Outcome: Completed  Goal: Establishment of adequate milk supply with medication/procedure interruptions  Description: INTERVENTIONS:  - Review techniques for milk expression (breast pumping). - Provide pumping equipment/supplies, instructions, and assistance until it is safe to breastfeed infant. Outcome: Completed  Goal: Experiences normal breast weaning course  Description: INTERVENTIONS:  - Assess for and manage engorgement. - Instruct on breast care. - Provide comfort measures. Outcome: Completed  Goal: Appropriate maternal -  bonding  Description: INTERVENTIONS:  - Assess caregiver- interactions. - Assess caregiver's emotional status and coping mechanisms. - Encourage caregiver to participate in  daily care. - Assess support systems available to mother/family.  - Provide /case management support as needed.   Outcome: Completed

## 2022-12-23 NOTE — PLAN OF CARE
Problem: BIRTH - VAGINAL/ SECTION  Goal: Fetal and maternal status remain reassuring during the birth process  Description: INTERVENTIONS:  - Monitor vital signs  - Monitor fetal heart rate  - Monitor uterine activity  - Monitor labor progression (vaginal delivery)  - DVT prophylaxis (C/S delivery)  - Surgical antibiotic prophylaxis (C/S delivery)  Outcome: Progressing     Problem: PAIN - ADULT  Goal: Verbalizes/displays adequate comfort level or patient's stated pain goal  Description: INTERVENTIONS:  - Encourage pt to monitor pain and request assistance  - Assess pain using appropriate pain scale  - Administer analgesics based on type and severity of pain and evaluate response  - Implement non-pharmacological measures as appropriate and evaluate response  - Consider cultural and social influences on pain and pain management  - Manage/alleviate anxiety  - Utilize distraction and/or relaxation techniques  - Monitor for opioid side effects  - Notify MD/LIP if interventions unsuccessful or patient reports new pain  - Anticipate increased pain with activity and pre-medicate as appropriate  Outcome: Progressing     Problem: ANXIETY  Goal: Will report anxiety at manageable levels  Description: INTERVENTIONS:  - Administer medication as ordered  - Teach and rehearse alternative coping skills  - Provide emotional support with 1:1 interaction with staff  Outcome: Progressing     Problem: Patient/Family Goals  Goal: Patient/Family Long Term Go

## 2022-12-24 VITALS
RESPIRATION RATE: 15 BRPM | HEIGHT: 60 IN | BODY MASS INDEX: 29.45 KG/M2 | WEIGHT: 150 LBS | OXYGEN SATURATION: 91 % | HEART RATE: 75 BPM | DIASTOLIC BLOOD PRESSURE: 71 MMHG | TEMPERATURE: 98 F | SYSTOLIC BLOOD PRESSURE: 107 MMHG

## 2022-12-24 NOTE — PROGRESS NOTES
Discharge teaching completed. All questions answered. Aware to follow up in 6 weeks and call for concerns if needed. Patient in stable condition.

## 2022-12-27 ENCOUNTER — TELEPHONE (OUTPATIENT)
Dept: OBGYN UNIT | Facility: HOSPITAL | Age: 32
End: 2022-12-27

## 2022-12-27 NOTE — PROGRESS NOTES
Reviewed self and infant care w / mom, she verbalizes understanding of instructions reviewed. Encourage to follow up w/ MDs as directed and w/ questions/concerns.  Tie to be released, br and pumping in the meantime, had ped appt this am.

## 2025-04-16 ENCOUNTER — TELEPHONE (OUTPATIENT)
Dept: OBGYN UNIT | Facility: HOSPITAL | Age: 35
End: 2025-04-16

## 2025-04-18 ENCOUNTER — HOSPITAL ENCOUNTER (OUTPATIENT)
Facility: HOSPITAL | Age: 35
Discharge: HOME | End: 2025-04-19
Attending: STUDENT IN AN ORGANIZED HEALTH CARE EDUCATION/TRAINING PROGRAM | Admitting: STUDENT IN AN ORGANIZED HEALTH CARE EDUCATION/TRAINING PROGRAM
Payer: COMMERCIAL

## 2025-04-19 VITALS
BODY MASS INDEX: 30.43 KG/M2 | TEMPERATURE: 98 F | SYSTOLIC BLOOD PRESSURE: 117 MMHG | WEIGHT: 155 LBS | HEIGHT: 60 IN | DIASTOLIC BLOOD PRESSURE: 75 MMHG | HEART RATE: 80 BPM | RESPIRATION RATE: 16 BRPM

## 2025-04-19 PROCEDURE — 99213 OFFICE O/P EST LOW 20 MIN: CPT

## 2025-04-19 PROCEDURE — 59025 FETAL NON-STRESS TEST: CPT

## 2025-04-19 NOTE — NST
Nonstress Test   Patient: Alex Mcginnis    Gestation: 39w3d    NST:       Variability: Moderate           Accelerations: Yes           Decelerations: None            Baseline: 125 BPM                       Contractions: Irregular (4-6)                                        Acoustic Stimulator: No           Nonstress Test Interpretation: Reactive                                 Additional Comments

## 2025-04-19 NOTE — DISCHARGE INSTRUCTIONS
Discharge Instructions    Diet: egular  Activity: Normal activity         General Instructions    Call your OB doctor if: Fluid leaking from your vagina;Uterine contractions increasing in intensity and frequency;Vaginal bleeding;Vaginal or rectal pressure;Temperature greater than 100F;Decrease in fetal movement  Early labor comfort measures: Drink fluids and eat small light meals;Take a walk;Relax, sleep, take a warm bath or shower for 30 minutes or less

## 2025-04-19 NOTE — PROGRESS NOTES
Discharge explained to pt/  pt requesting sve before she leaves, denies any increase in ctx strength or timing.  Sve done- no change.  Efm removed.  Discharge instructions explained to the pt.  Pt voices her understanding and agreement.  Pt up to change.  Pt home in stable cond

## 2025-04-19 NOTE — PROGRESS NOTES
Received pt to the unit via ambulation with c/o awareness of ctx since .  Not painful at all.  Pt states was 3 cms last week and just wanted to have another exam.  Pt is a 33 yo  with an chely od 24, making her 39.3 weeks pregnant.  Pt has a hx of 2 prev uncomplicated 's, no health problems or pregnancy complications. Pt changed and into bed that is in the low locked position.  Efm explained and then applied.  Hx obtained.  Sve done.  Pt informed will observe and then notify md.  Pt in agreement.  Call bell within easy reach

## 2025-04-23 ENCOUNTER — HOSPITAL ENCOUNTER (INPATIENT)
Facility: HOSPITAL | Age: 35
LOS: 2 days | Discharge: HOME | End: 2025-04-25
Attending: OBSTETRICS & GYNECOLOGY | Admitting: OBSTETRICS & GYNECOLOGY
Payer: COMMERCIAL

## 2025-04-23 ENCOUNTER — APPOINTMENT (OUTPATIENT)
Dept: OBGYN CLINIC | Facility: HOSPITAL | Age: 35
End: 2025-04-23
Payer: COMMERCIAL

## 2025-04-23 ENCOUNTER — ANESTHESIA (OUTPATIENT)
Dept: OBGYN UNIT | Facility: HOSPITAL | Age: 35
End: 2025-04-23
Payer: COMMERCIAL

## 2025-04-23 ENCOUNTER — ANESTHESIA EVENT (OUTPATIENT)
Dept: OBGYN UNIT | Facility: HOSPITAL | Age: 35
End: 2025-04-23
Payer: COMMERCIAL

## 2025-04-23 PROBLEM — Z34.90 PREGNANCY (HCC): Status: ACTIVE | Noted: 2025-04-23

## 2025-04-23 LAB
ANTIBODY SCREEN: NEGATIVE
BASOPHILS # BLD AUTO: 0.07 X10(3) UL (ref 0–0.2)
BASOPHILS NFR BLD AUTO: 0.6 %
EOSINOPHIL # BLD AUTO: 0.33 X10(3) UL (ref 0–0.7)
EOSINOPHIL NFR BLD AUTO: 2.7 %
ERYTHROCYTE [DISTWIDTH] IN BLOOD BY AUTOMATED COUNT: 15.2 %
HCT VFR BLD AUTO: 32.1 % (ref 35–48)
HGB BLD-MCNC: 10.4 G/DL (ref 12–16)
IMM GRANULOCYTES # BLD AUTO: 0.46 X10(3) UL (ref 0–1)
IMM GRANULOCYTES NFR BLD: 3.8 %
LYMPHOCYTES # BLD AUTO: 2.02 X10(3) UL (ref 1–4)
LYMPHOCYTES NFR BLD AUTO: 16.6 %
MCH RBC QN AUTO: 25.9 PG (ref 26–34)
MCHC RBC AUTO-ENTMCNC: 32.4 G/DL (ref 31–37)
MCV RBC AUTO: 80 FL (ref 80–100)
MONOCYTES # BLD AUTO: 1 X10(3) UL (ref 0.1–1)
MONOCYTES NFR BLD AUTO: 8.2 %
NEUTROPHILS # BLD AUTO: 8.26 X10 (3) UL (ref 1.5–7.7)
NEUTROPHILS # BLD AUTO: 8.26 X10(3) UL (ref 1.5–7.7)
NEUTROPHILS NFR BLD AUTO: 68.1 %
PLATELET # BLD AUTO: 274 10(3)UL (ref 150–450)
RBC # BLD AUTO: 4.01 X10(6)UL (ref 3.8–5.3)
RH BLOOD TYPE: POSITIVE
T PALLIDUM AB SER QL IA: NONREACTIVE
WBC # BLD AUTO: 12.1 X10(3) UL (ref 4–11)

## 2025-04-23 PROCEDURE — 86900 BLOOD TYPING SEROLOGIC ABO: CPT | Performed by: OBSTETRICS & GYNECOLOGY

## 2025-04-23 PROCEDURE — 85025 COMPLETE CBC W/AUTO DIFF WBC: CPT | Performed by: OBSTETRICS & GYNECOLOGY

## 2025-04-23 PROCEDURE — 86901 BLOOD TYPING SEROLOGIC RH(D): CPT | Performed by: OBSTETRICS & GYNECOLOGY

## 2025-04-23 PROCEDURE — 86850 RBC ANTIBODY SCREEN: CPT | Performed by: OBSTETRICS & GYNECOLOGY

## 2025-04-23 PROCEDURE — 86780 TREPONEMA PALLIDUM: CPT | Performed by: OBSTETRICS & GYNECOLOGY

## 2025-04-23 RX ORDER — IBUPROFEN 600 MG/1
600 TABLET, FILM COATED ORAL ONCE AS NEEDED
Status: DISCONTINUED | OUTPATIENT
Start: 2025-04-23 | End: 2025-04-24 | Stop reason: HOSPADM

## 2025-04-23 RX ORDER — SODIUM CHLORIDE 9 MG/ML
INJECTION, SOLUTION INTRAMUSCULAR; INTRAVENOUS; SUBCUTANEOUS
Status: DISPENSED
Start: 2025-04-23 | End: 2025-04-24

## 2025-04-23 RX ORDER — SODIUM CHLORIDE, SODIUM LACTATE, POTASSIUM CHLORIDE, CALCIUM CHLORIDE 600; 310; 30; 20 MG/100ML; MG/100ML; MG/100ML; MG/100ML
INJECTION, SOLUTION INTRAVENOUS CONTINUOUS
Status: DISCONTINUED | OUTPATIENT
Start: 2025-04-23 | End: 2025-04-24 | Stop reason: HOSPADM

## 2025-04-23 RX ORDER — BUPIVACAINE HYDROCHLORIDE 2.5 MG/ML
30 INJECTION, SOLUTION EPIDURAL; INFILTRATION; INTRACAUDAL; PERINEURAL AS NEEDED
Status: DISCONTINUED | OUTPATIENT
Start: 2025-04-23 | End: 2025-04-24

## 2025-04-23 RX ORDER — ACETAMINOPHEN 500 MG
1000 TABLET ORAL EVERY 6 HOURS PRN
Status: DISCONTINUED | OUTPATIENT
Start: 2025-04-23 | End: 2025-04-24 | Stop reason: HOSPADM

## 2025-04-23 RX ORDER — TERBUTALINE SULFATE 1 MG/ML
0.25 INJECTION SUBCUTANEOUS AS NEEDED
Status: DISCONTINUED | OUTPATIENT
Start: 2025-04-23 | End: 2025-04-24 | Stop reason: HOSPADM

## 2025-04-23 RX ORDER — ACETAMINOPHEN 500 MG
500 TABLET ORAL EVERY 6 HOURS PRN
Status: DISCONTINUED | OUTPATIENT
Start: 2025-04-23 | End: 2025-04-24 | Stop reason: HOSPADM

## 2025-04-23 RX ORDER — DOXYLAMINE SUCCINATE AND PYRIDOXINE HYDROCHLORIDE, DELAYED RELEASE TABLETS 10 MG/10 MG 10; 10 MG/1; MG/1
0.5 TABLET, DELAYED RELEASE ORAL NIGHTLY
COMMUNITY

## 2025-04-23 RX ORDER — BUPIVACAINE HCL/0.9 % NACL/PF 0.25 %
5 PLASTIC BAG, INJECTION (ML) EPIDURAL AS NEEDED
Status: DISCONTINUED | OUTPATIENT
Start: 2025-04-23 | End: 2025-04-24

## 2025-04-23 RX ORDER — DEXTROSE, SODIUM CHLORIDE, SODIUM LACTATE, POTASSIUM CHLORIDE, AND CALCIUM CHLORIDE 5; .6; .31; .03; .02 G/100ML; G/100ML; G/100ML; G/100ML; G/100ML
INJECTION, SOLUTION INTRAVENOUS AS NEEDED
Status: DISCONTINUED | OUTPATIENT
Start: 2025-04-23 | End: 2025-04-24 | Stop reason: HOSPADM

## 2025-04-23 RX ORDER — ONDANSETRON 2 MG/ML
4 INJECTION INTRAMUSCULAR; INTRAVENOUS EVERY 6 HOURS PRN
Status: DISCONTINUED | OUTPATIENT
Start: 2025-04-23 | End: 2025-04-24 | Stop reason: HOSPADM

## 2025-04-23 RX ORDER — CITRIC ACID/SODIUM CITRATE 334-500MG
30 SOLUTION, ORAL ORAL AS NEEDED
Status: DISCONTINUED | OUTPATIENT
Start: 2025-04-23 | End: 2025-04-24 | Stop reason: HOSPADM

## 2025-04-23 RX ORDER — BUPIVACAINE HCL/0.9 % NACL/PF 0.25 %
PLASTIC BAG, INJECTION (ML) EPIDURAL
Status: COMPLETED
Start: 2025-04-23 | End: 2025-04-23

## 2025-04-23 RX ORDER — BUPIVACAINE HYDROCHLORIDE 2.5 MG/ML
INJECTION, SOLUTION EPIDURAL; INFILTRATION; INTRACAUDAL; PERINEURAL AS NEEDED
Status: DISCONTINUED | OUTPATIENT
Start: 2025-04-23 | End: 2025-04-24 | Stop reason: SURG

## 2025-04-23 RX ORDER — LIDOCAINE HYDROCHLORIDE AND EPINEPHRINE 15; 5 MG/ML; UG/ML
5 INJECTION, SOLUTION EPIDURAL AS NEEDED
Status: DISCONTINUED | OUTPATIENT
Start: 2025-04-23 | End: 2025-04-24

## 2025-04-23 RX ORDER — LIDOCAINE HYDROCHLORIDE 20 MG/ML
5 INJECTION, SOLUTION EPIDURAL; INFILTRATION; INTRACAUDAL; PERINEURAL AS NEEDED
Status: DISCONTINUED | OUTPATIENT
Start: 2025-04-23 | End: 2025-04-24

## 2025-04-23 RX ORDER — ROPIVACAINE HYDROCHLORIDE 5 MG/ML
30 INJECTION, SOLUTION EPIDURAL; INFILTRATION; PERINEURAL AS NEEDED
Status: DISCONTINUED | OUTPATIENT
Start: 2025-04-23 | End: 2025-04-24 | Stop reason: HOSPADM

## 2025-04-23 RX ORDER — LIDOCAINE HYDROCHLORIDE AND EPINEPHRINE 15; 5 MG/ML; UG/ML
INJECTION, SOLUTION EPIDURAL AS NEEDED
Status: DISCONTINUED | OUTPATIENT
Start: 2025-04-23 | End: 2025-04-24 | Stop reason: SURG

## 2025-04-23 RX ORDER — NALBUPHINE HYDROCHLORIDE 10 MG/ML
2.5 INJECTION INTRAMUSCULAR; INTRAVENOUS; SUBCUTANEOUS
Status: DISCONTINUED | OUTPATIENT
Start: 2025-04-23 | End: 2025-04-24

## 2025-04-23 RX ORDER — SODIUM CHLORIDE 9 MG/ML
10 INJECTION, SOLUTION INTRAMUSCULAR; INTRAVENOUS; SUBCUTANEOUS AS NEEDED
Status: DISCONTINUED | OUTPATIENT
Start: 2025-04-23 | End: 2025-04-24

## 2025-04-23 RX ADMIN — BUPIVACAINE HYDROCHLORIDE 5 ML: 2.5 INJECTION, SOLUTION EPIDURAL; INFILTRATION; INTRACAUDAL; PERINEURAL at 21:00:00

## 2025-04-23 RX ADMIN — LIDOCAINE HYDROCHLORIDE AND EPINEPHRINE 3 ML: 15; 5 INJECTION, SOLUTION EPIDURAL at 20:56:00

## 2025-04-23 NOTE — PLAN OF CARE
Problem: BIRTH - VAGINAL/ SECTION  Goal: Fetal and maternal status remain reassuring during the birth process  Description: INTERVENTIONS:- Monitor vital signs- Monitor fetal heart rate- Monitor uterine activity- Monitor labor progression (vaginal delivery)- DVT prophylaxis (C/S delivery)- Surgical antibiotic prophylaxis (C/S delivery)  Outcome: Progressing     Problem: PAIN - ADULT  Goal: Verbalizes/displays adequate comfort level or patient's stated pain goal  Description: INTERVENTIONS:- Encourage pt to monitor pain and request assistance- Assess pain using appropriate pain scale- Administer analgesics based on type and severity of pain and evaluate response- Implement non-pharmacological measures as appropriate and evaluate response- Consider cultural and social influences on pain and pain management- Manage/alleviate anxiety- Utilize distraction and/or relaxation techniques- Monitor for opioid side effects- Notify MD/LIP if interventions unsuccessful or patient reports new pain- Anticipate increased pain with activity and pre-medicate as appropriate  Outcome: Progressing     Problem: ANXIETY  Goal: Will report anxiety at manageable levels  Description: INTERVENTIONS:- Administer medication as ordered- Teach and rehearse alternative coping skills- Provide emotional support with 1:1 interaction with staff  Outcome: Progressing     Problem: Patient/Family Goals  Goal: Patient/Family Long Term Goal  Description: Patient's Long Term Goal: safe delivery of infant  Outcome: Progressing  Goal: Patient/Family Short Term Goal  Description: Patient's Short Term Goal: adequate pain management  IOutcome: Progressing

## 2025-04-24 LAB
BASOPHILS # BLD AUTO: 0.05 X10(3) UL (ref 0–0.2)
BASOPHILS NFR BLD AUTO: 0.4 %
EOSINOPHIL # BLD AUTO: 0.29 X10(3) UL (ref 0–0.7)
EOSINOPHIL NFR BLD AUTO: 2 %
ERYTHROCYTE [DISTWIDTH] IN BLOOD BY AUTOMATED COUNT: 15.6 %
HCT VFR BLD AUTO: 28.1 % (ref 35–48)
HGB BLD-MCNC: 9 G/DL (ref 12–16)
IMM GRANULOCYTES # BLD AUTO: 0.3 X10(3) UL (ref 0–1)
IMM GRANULOCYTES NFR BLD: 2.1 %
LYMPHOCYTES # BLD AUTO: 1.68 X10(3) UL (ref 1–4)
LYMPHOCYTES NFR BLD AUTO: 11.8 %
MCH RBC QN AUTO: 26.1 PG (ref 26–34)
MCHC RBC AUTO-ENTMCNC: 32 G/DL (ref 31–37)
MCV RBC AUTO: 81.4 FL (ref 80–100)
MONOCYTES # BLD AUTO: 1.14 X10(3) UL (ref 0.1–1)
MONOCYTES NFR BLD AUTO: 8 %
NEUTROPHILS # BLD AUTO: 10.74 X10 (3) UL (ref 1.5–7.7)
NEUTROPHILS # BLD AUTO: 10.74 X10(3) UL (ref 1.5–7.7)
NEUTROPHILS NFR BLD AUTO: 75.7 %
PLATELET # BLD AUTO: 199 10(3)UL (ref 150–450)
RBC # BLD AUTO: 3.45 X10(6)UL (ref 3.8–5.3)
WBC # BLD AUTO: 14.2 X10(3) UL (ref 4–11)

## 2025-04-24 PROCEDURE — 0KQM0ZZ REPAIR PERINEUM MUSCLE, OPEN APPROACH: ICD-10-PCS | Performed by: OBSTETRICS & GYNECOLOGY

## 2025-04-24 PROCEDURE — 10907ZC DRAINAGE OF AMNIOTIC FLUID, THERAPEUTIC FROM PRODUCTS OF CONCEPTION, VIA NATURAL OR ARTIFICIAL OPENING: ICD-10-PCS | Performed by: OBSTETRICS & GYNECOLOGY

## 2025-04-24 PROCEDURE — 85025 COMPLETE CBC W/AUTO DIFF WBC: CPT | Performed by: OBSTETRICS & GYNECOLOGY

## 2025-04-24 PROCEDURE — 3E033VJ INTRODUCTION OF OTHER HORMONE INTO PERIPHERAL VEIN, PERCUTANEOUS APPROACH: ICD-10-PCS | Performed by: OBSTETRICS & GYNECOLOGY

## 2025-04-24 RX ORDER — ACETAMINOPHEN 500 MG
500 TABLET ORAL EVERY 6 HOURS PRN
Status: DISCONTINUED | OUTPATIENT
Start: 2025-04-24 | End: 2025-04-25

## 2025-04-24 RX ORDER — DOCUSATE SODIUM 100 MG/1
100 CAPSULE, LIQUID FILLED ORAL
Status: DISCONTINUED | OUTPATIENT
Start: 2025-04-24 | End: 2025-04-25

## 2025-04-24 RX ORDER — SIMETHICONE 80 MG
80 TABLET,CHEWABLE ORAL 3 TIMES DAILY PRN
Status: DISCONTINUED | OUTPATIENT
Start: 2025-04-24 | End: 2025-04-25

## 2025-04-24 RX ORDER — IBUPROFEN 600 MG/1
600 TABLET, FILM COATED ORAL EVERY 6 HOURS
Status: DISCONTINUED | OUTPATIENT
Start: 2025-04-24 | End: 2025-04-25

## 2025-04-24 RX ORDER — BISACODYL 10 MG
10 SUPPOSITORY, RECTAL RECTAL ONCE AS NEEDED
Status: DISCONTINUED | OUTPATIENT
Start: 2025-04-24 | End: 2025-04-25

## 2025-04-24 RX ORDER — AMMONIA 15 % (W/V)
0.3 AMPUL (EA) INHALATION AS NEEDED
Status: DISCONTINUED | OUTPATIENT
Start: 2025-04-24 | End: 2025-04-25

## 2025-04-24 RX ORDER — ACETAMINOPHEN 500 MG
1000 TABLET ORAL EVERY 6 HOURS PRN
Status: DISCONTINUED | OUTPATIENT
Start: 2025-04-24 | End: 2025-04-25

## 2025-04-24 RX ORDER — IBUPROFEN 600 MG/1
600 TABLET, FILM COATED ORAL EVERY 6 HOURS PRN
Status: DISCONTINUED | OUTPATIENT
Start: 2025-04-24 | End: 2025-04-25

## 2025-04-24 NOTE — H&P
[facility]  History & Physical    Alex Mcginnis Patient Status:  Inpatient    1990 MRN [id number]   Location [facility] LABOR & DELIVERY Attending Jodi Bearden MD   Hosp Day # 0 PCP No primary care provider on file.     SUBJECTIVE:    Alex Mcginnis is a 34 year old  at 40+0 weeks here for induction of labor for post dates. Good FM, no VB, no LOF, few ctx.    Prenatal issues:  1) Bilateral CPCs with otherwise normal anatomy ultrasound, declined panorama    Obstetric History:   OB History    Para Term  AB Living   3 2 2 0 0 2   SAB IAB Ectopic Multiple Live Births   0 0 0  2      # Outcome Date GA Lbr Bernard/2nd Weight Sex Type Anes PTL Lv   3 Current            2 Term 22 40w0d 10:37 / 00:12 6 lb 10.5 oz (3.02 kg) F NORMAL SPONT EPI N CARL      Complications: Group B beta strep +   1 Term 21 38w1d 11:40 / 02:51 6 lb 9.5 oz (2.99 kg) F NORMAL SPONT EPI N CARL      Complications: Group B beta strep +     Past Medical History: Past Medical History[1]  Past Social History: Past Surgical History[2]  Family History: Family History[3]  Social History:   Social History     Tobacco Use    Smoking status: Never    Smokeless tobacco: Never   Substance Use Topics    Alcohol use: Yes     Comment: 1/mo 1-2 drinks       Home Meds: Prescriptions Prior to Admission[4]  Allergies: Allergies[5]    OBJECTIVE:    Temp:  [99.3 °F (37.4 °C)-99.7 °F (37.6 °C)] 99.3 °F (37.4 °C)  Pulse:  [80-92] 91  Resp:  [15] 15  BP: (113-124)/(67-84) 124/84    Lungs:    Unlabored breathing, no wheezing   Heart:   regular rate and rhythm   Abdomen: Soft NT ND   Fetal Surveillance:  140 BPM   Mod ginger, +accels, no decels      Cervix: 3.5/70/-3     Lab Review:  O, Rh+, Rubella-immune, Hepatitis B surface antigen non-reactive, GBS negative     ASSESSMENT:   at 40+0 weeks here for IOL for post dates.    PLAN:  1) FHT category I  2) IOL: pitocin, offered AROM, she wants after epidural  3) Ok for epidural  4) GBS  neg    Risks, benefits, alternatives and possible complications have been discussed in detail with the patient.  Pre-admission, admission, and post admission procedures and expectations were discussed in detail.  All questions answered, all appropriate consents will be signed at the Hospital. Admission is planned for today.   Continue present management..    Jodi Bearden MD  2025  8:35 PM       [1]   Past Medical History:   Family history of ovarian cancer    mother - BRCA neg    Type O blood, Rh positive   [2]   Past Surgical History:  Procedure Laterality Date      2021    2nd dec lac tear    [place] teeth removed  2009   [3]   Family History  Problem Relation Age of Onset    Ovarian Cancer Mother     Lipids Mother     Lipids Maternal Grandmother     Cataracts Maternal Grandmother     Other (vertigo) Maternal Grandmother     Asthma Brother     Allergies Brother         food    Lipids Father     Cancer Maternal Grandfather         stomach and lung    No Known Problems Paternal Grandmother     No Known Problems Paternal Grandfather     Allergies Daughter         peanut, egg   [4]   Medications Prior to Admission   Medication Sig Dispense Refill Last Dose/Taking    doxylamine-pyridoxine 10-10 MG Oral Tab EC Take 0.5 tablets by mouth nightly.   Taking    prenatal vitamin with DHA 27-0.8-228 MG Oral Cap Take 1 capsule by mouth in the morning.   2025    ibuprofen 600 MG Oral Tab Take 1 tablet (600 mg total) by mouth every 6 (six) hours as needed for Pain. 60 tablet 0 More than a month   [5]   Allergies  Allergen Reactions    Penicillins HIVES

## 2025-04-24 NOTE — PLAN OF CARE
Problem: BIRTH - VAGINAL/ SECTION  Goal: Fetal and maternal status remain reassuring during the birth process  Description: INTERVENTIONS:- Monitor vital signs- Monitor fetal heart rate- Monitor uterine activity- Monitor labor progression (vaginal delivery)- DVT prophylaxis (C/S delivery)- Surgical antibiotic prophylaxis (C/S delivery)  Outcome: Progressing     Problem: PAIN - ADULT  Goal: Verbalizes/displays adequate comfort level or patient's stated pain goal  Description: INTERVENTIONS:- Encourage pt to monitor pain and request assistance- Assess pain using appropriate pain scale- Administer analgesics based on type and severity of pain and evaluate response- Implement non-pharmacological measures as appropriate and evaluate response- Consider cultural and social influences on pain and pain management- Manage/alleviate anxiety- Utilize distraction and/or relaxation techniques- Monitor for opioid side effects- Notify MD/LIP if interventions unsuccessful or patient reports new pain- Anticipate increased pain with activity and pre-medicate as appropriate  Outcome: Progressing     Problem: ANXIETY  Goal: Will report anxiety at manageable levels  Description: INTERVENTIONS:- Administer medication as ordered- Teach and rehearse alternative coping skills- Provide emotional support with 1:1 interaction with staff  Outcome: Progressing     Problem: Patient/Family Goals  Goal: Patient/Family Long Term Goal  Description: Patient's Long Term Goal: Uncomplicated vaginal delivery    Interventions:  VS per protocol  I&O  Ice chips and sips as tolerated  EFM per protocol  Maintain IV as ordered  Antibiotics as needed per protocol  Informed consentInterventions:- - See additional Care Plan goals for specific interventions  Outcome: Progressing  Goal: Patient/Family Short Term Goal  Description: Patient's Short Term Goal: Adequate pain control with delivery of infant  Interventions:  Pain assessment scores as  ordered  Patient scores pain a \"3\" or less  Multidisciplinary care   Nonpharmacologic comfort measuresInterventions: - - See additional Care Plan goals for specific interventions  Outcome: Progressing

## 2025-04-24 NOTE — L&D DELIVERY NOTE
She was found to be complete/+2. She pushed with good effort for three contractions under epidural anesthesia. She then delivered a viable baby boy via . The baby was vigorous on delivery and was bulb suctioned. He was placed on the mother's lap for warming. Cord clamping was delayed 30 seconds. The cord was clamped and cut, and cord blood was collected. The placenta delivered spontaneously, was examined, and was intact. Her uterus was massaged and was firm. She had a 2nd degree laceration repaired with 2-0 vicyrl.  mL.    Gwyn Mcginnis [[id number]]      Labor Events     labor?: No   steroids?: None  Antibiotics received during labor?: No  Rupture date/time: 2025     Rupture type: AROM  Fluid color: Clear  Labor type: Induced Onset of Labor  Induction: Oxytocin, AROM  Indications for induction: Post-term Gestation  Induction comment: Elective  Intrapartum & labor complications: None       Labor Event Times    Labor onset date/time: 2025  Dilation complete date/time: 20257  Start pushing date/time: 2025 23:59       [place] Presentation    Presentation: Vertex  Position: Occiput Anterior       Operative Delivery    Operative Vaginal Delivery: No                Shoulder Dystocia    Shoulder Dystocia: No       Anesthesia    Method: Epidural               Delivery      Head delivery date/time: 2025 00:01:59   Delivery date/time:  25 00:02:23   Delivery type: Normal spontaneous vaginal delivery    Details:     Delivery location: delivery room       Delivery Providers    Delivering Clinician: Jodi Bearden MD   Delivery personnel:  Provider Role   Nisha Phelps RN Baby Nurse   Bobbi Love RN Delivery Nurse             Cord    Vessels: 3 Vessels  Complications: None  Timed cord clamping: Yes  Time in sec: 35  Cord blood disposition: cord blood bank  Gases sent?: No       Resuscitation    Method: None        Measurements       Weight: 3450 g 7 lb 9.7 oz Length: 49.5 cm     Head circum.: 34 cm Chest circum.: 32.5 cm      Abdominal circum.: 32 cm           Placenta    Date/time: 2025 00:06  Removal: Spontaneous  Appearance: Intact  Disposition: held for future pathology       Apgars    Living status: Living   Apgar Scoring Key:    0 1 2    Skin color Blue or pale Acrocyanotic Completely pink    Heart rate Absent <100 bpm >100 bpm    Reflex irritability No response Grimace Cry or active withdrawal    Muscle tone Limp Some flexion Active motion    Respiratory effort Absent Weak cry; hypoventilation Good, crying              1 Minute:  5 Minute:  10 Minute:  15 Minute:  20 Minute:      Skin color: 1  1       Heart rate: 2  2       Reflex irritablity: 2  2       Muscle tone: 2  2       Respiratory effort: 2  2       Total: 9  9          Apgars assigned by: CHON BEYER RN  [place] disposition: with mother       Skin to Skin    No data filed       Vaginal Count    Initial count RN: Bobbi Love RN  Initial count Tech: Catina Muñoz    Initial counts 11   0    Final counts 11   1    Final count RN: Bobbi Love RN  Final count MD: Jodi Bearden MD       Lacerations    Episiotomy: None  Perineal lacerations: 2nd Repaired?: Yes     Vaginal laceration?: No      Cervical laceration?: No    Clitoral laceration?: No    Quantitative blood loss (mL): 214

## 2025-04-24 NOTE — ANESTHESIA PROCEDURE NOTES
Labor Analgesia    Date/Time: 4/23/2025 8:47 PM    Performed by: Christina Lopez MD  Authorized by: Christina Lopez MD      General Information and Staff    Start Time:  4/23/2025 8:47 PM  End Time:  4/23/2025 8:56 PM  Anesthesiologist:  Christina Lopez MD  Performed by:  Anesthesiologist  Patient Location:  OB  Site Identification: surface landmarks    Reason for Block: labor epidural    Preanesthetic Checklist: patient identified, IV checked, risks and benefits discussed, monitors and equipment checked, pre-op evaluation, timeout performed, IV bolus, anesthesia consent and sterile technique used      Procedure Details    Patient Position:  Sitting  Prep: ChloraPrep    Monitoring:  Heart rate and continuous pulse ox  Approach:  Midline    Epidural Needle    Injection Technique:  KARLA saline  Needle Type:  Tuohy  Needle Gauge:  17 G  Needle Length:  3.375 in  Needle Insertion Depth:  6  Location:  L3-4    Spinal Needle      Catheter    Catheter Type:  End hole  Catheter Size:  19 G  Catheter at Skin Depth:  12  Test Dose:  Negative    Assessment      Additional Comments

## 2025-04-24 NOTE — ANESTHESIA PREPROCEDURE EVALUATION
PRE-OP EVALUATION    Patient Name: Alex Mcginnis    Admit Diagnosis: PREGNANCY  Pregnancy (HCC)    Pre-op Diagnosis: * No pre-op diagnosis entered *        Anesthesia Procedure: LABOR ANALGESIA    * No surgeons found in log *    Pre-op vitals reviewed.  Temp: 99.3 °F (37.4 °C)  Pulse: 91  Resp: 15  BP: 124/84     Body mass index is 30.27 kg/m².    Current medications reviewed.  Hospital Medications:  Current Medications[1]    Outpatient Medications:   Prescriptions Prior to Admission[2]    Allergies: Penicillins      Anesthesia Evaluation    Patient summary reviewed.    Anesthetic Complications  (-) history of anesthetic complications         GI/Hepatic/Renal    Negative GI/hepatic/renal ROS.                             Cardiovascular    Negative cardiovascular ROS.    Exercise tolerance: good     MET: >4                                           Endo/Other               (+) anemia                   Pulmonary    Negative pulmonary ROS.                       Neuro/Psych    Negative neuro/psych ROS.                          IUP    Past Surgical History[3]  Social Hx on file[4]  History   Drug Use Unknown     Available pre-op labs reviewed.  Lab Results   Component Value Date    WBC 12.1 (H) 04/23/2025    RBC 4.01 04/23/2025    HGB 10.4 (L) 04/23/2025    HCT 32.1 (L) 04/23/2025    MCV 80.0 04/23/2025    MCH 25.9 (L) 04/23/2025    MCHC 32.4 04/23/2025    RDW 15.2 04/23/2025    .0 04/23/2025               Airway      Mallampati: II  Mouth opening: >3 FB  TM distance: > 6 cm   Cardiovascular    Cardiovascular exam normal.         Dental             Pulmonary    Pulmonary exam normal.                 Other findings        ASA: 2   Plan: epidural             Plan/risks discussed with: patient            Present on Admission:  **None**               [1]  • lactated ringers infusion   Intravenous Continuous   • dextrose in lactated ringers 5% infusion   Intravenous PRN   • lactated ringers IV bolus 500 mL  500 mL  Intravenous PRN   • acetaminophen (Tylenol Extra Strength) tab 500 mg  500 mg Oral Q6H PRN   • acetaminophen (Tylenol Extra Strength) tab 1,000 mg  1,000 mg Oral Q6H PRN   • ibuprofen (Motrin) tab 600 mg  600 mg Oral Once PRN   • ondansetron (Zofran) 4 MG/2ML injection 4 mg  4 mg Intravenous Q6H PRN   • oxyTOCIN in sodium chloride 0.9% (Pitocin) 30 Units/500mL infusion premix  62.5-900 aretha-units/min Intravenous Continuous   • terbutaline (Brethine) 1 MG/ML injection 0.25 mg  0.25 mg Subcutaneous PRN   • sodium citrate-citric acid (Bicitra) 500-334 MG/5ML oral solution 30 mL  30 mL Oral PRN   • ropivacaine (Naropin) 0.5% injection  30 mL Injection PRN   • oxyTOCIN in sodium chloride 0.9% (Pitocin) 30 Units/500mL infusion premix  0.5-20 aretha-units/min Intravenous Continuous   • lactated ringers IV bolus 1,000 mL  1,000 mL Intravenous Once   • fentaNYL-bupivacaine 2 mcg/mL-0.125% in sodium chloride 0.9% 100 mL EPIDURAL infusion premix  12 mL/hr Epidural Continuous   • fentaNYL (Sublimaze) 50 mcg/mL injection 100 mcg  100 mcg Epidural Once   • lidocaine 1.5%-EPINEPHrine 1:200,000 (Xylocaine-Epinephrine) injection  5 mL Injection PRN   • bupivacaine PF (Marcaine) 0.25% injection  30 mL Injection PRN   • lidocaine PF (Xylocaine-MPF) 2% injection  5 mL Injection PRN   • sodium chloride 0.9% PF injection 10 mL  10 mL Injection PRN   • ePHEDrine (PF) 25 MG/5 ML injection 5 mg  5 mg Intravenous PRN   • nalbuphine (Nubain) 10 mg/mL injection 2.5 mg  2.5 mg Intravenous Q15 Min PRN   • sodium chloride 0.9% PF 0.9% injection       • fentaNYL (Sublimaze) 50 mcg/mL injection       • ePHEDrine (PF) 25 MG/5 ML injection       [2]  Medications Prior to Admission   Medication Sig Dispense Refill Last Dose/Taking   • doxylamine-pyridoxine 10-10 MG Oral Tab EC Take 0.5 tablets by mouth nightly.   Taking   • prenatal vitamin with DHA 27-0.8-228 MG Oral Cap Take 1 capsule by mouth in the morning.   4/23/2025   • ibuprofen 600 MG Oral  Tab Take 1 tablet (600 mg total) by mouth every 6 (six) hours as needed for Pain. 60 tablet 0 More than a month   [3]  Past Surgical History:  Procedure Laterality Date   •   2021    2nd dec lac tear   • [place] teeth removed      4   [4]  Social History  Socioeconomic History   • Marital status:    Occupational History   • Occupation: homemaker   Tobacco Use   • Smoking status: Never   • Smokeless tobacco: Never   Vaping Use   • Vaping status: Never Used   Substance and Sexual Activity   • Alcohol use: Yes     Comment: 1/mo 1-2 drinks   • Drug use: Never   • Sexual activity: Yes     Partners: Male     Comment: 1/ yr, no sti   Other Topics Concern   •  Service No   • Blood Transfusions No   • Caffeine Concern No     Comment: 1-2/d   • Occupational Exposure No   • Hobby Hazards No   • Sleep Concern Yes     Comment: waking up at night 1-2 x    • Stress Concern No   • Weight Concern No   • Special Diet Yes     Comment: more vegan 80%   • Back Care No   • Exercise Yes     Comment: run 5 d/wk 2-3 mi   • Seat Belt Yes   • Self-Exams No

## 2025-04-24 NOTE — PROGRESS NOTES
Patient up to bathroom with assist x 2.  Voided 175 lbs. Patient transferred to mother/baby room 2208 per wheelchair in stable condition with baby and personal belongings.  Accompanied by significant other and staff.  Report given to mother/baby RN.

## 2025-04-24 NOTE — PLAN OF CARE
Problem: BIRTH - VAGINAL/ SECTION  Goal: Fetal and maternal status remain reassuring during the birth process  Description: INTERVENTIONS:- Monitor vital signs- Monitor fetal heart rate- Monitor uterine activity- Monitor labor progression (vaginal delivery)- DVT prophylaxis (C/S delivery)- Surgical antibiotic prophylaxis (C/S delivery)  2025 by Bobbi Love RN  Outcome: Completed  2025 by Bobbi Love RN  Outcome: Progressing     Problem: PAIN - ADULT  Goal: Verbalizes/displays adequate comfort level or patient's stated pain goal  Description: INTERVENTIONS:- Encourage pt to monitor pain and request assistance- Assess pain using appropriate pain scale- Administer analgesics based on type and severity of pain and evaluate response- Implement non-pharmacological measures as appropriate and evaluate response- Consider cultural and social influences on pain and pain management- Manage/alleviate anxiety- Utilize distraction and/or relaxation techniques- Monitor for opioid side effects- Notify MD/LIP if interventions unsuccessful or patient reports new pain- Anticipate increased pain with activity and pre-medicate as appropriate  2025 by Bobbi Love RN  Outcome: Completed  2025 by Bobbi Love RN  Outcome: Progressing     Problem: ANXIETY  Goal: Will report anxiety at manageable levels  Description: INTERVENTIONS:- Administer medication as ordered- Teach and rehearse alternative coping skills- Provide emotional support with 1:1 interaction with staff  2025 by Bobbi Love RN  Outcome: Completed  2025 by Bobbi Love RN  Outcome: Progressing     Problem: Patient/Family Goals  Goal: Patient/Family Long Term Goal  Description: Patient's Long Term Goal: Uncomplicated vaginal delivery    Interventions:  VS per protocol  I&O  Ice chips and sips as tolerated  EFM per protocol  Maintain IV as ordered  Antibiotics as needed  per protocol  Informed consentInterventions:- - See additional Care Plan goals for specific interventions  4/24/2025 0026 by Bobbi Love, RN  Outcome: Completed  4/23/2025 1940 by Bobbi Love, RN  Outcome: Progressing  Goal: Patient/Family Short Term Goal  Description: Patient's Short Term Goal: Adequate pain control with delivery of infant  Interventions:  Pain assessment scores as ordered  Patient scores pain a \"3\" or less  Multidisciplinary care   Nonpharmacologic comfort measuresInterventions: - - See additional Care Plan goals for specific interventions  4/24/2025 0026 by Bobbi Love, RN  Outcome: Completed  4/23/2025 1940 by Bobbi Love, RN  Outcome: Progressing

## 2025-04-25 VITALS
TEMPERATURE: 98 F | RESPIRATION RATE: 16 BRPM | SYSTOLIC BLOOD PRESSURE: 107 MMHG | HEART RATE: 69 BPM | HEIGHT: 60 IN | OXYGEN SATURATION: 100 % | WEIGHT: 155 LBS | BODY MASS INDEX: 30.43 KG/M2 | DIASTOLIC BLOOD PRESSURE: 74 MMHG

## 2025-04-25 NOTE — PLAN OF CARE
Problem: POSTPARTUM  Goal: Long Term Goal:Experiences normal postpartum course  Description: INTERVENTIONS:- Assess and monitor vital signs and lab values.- Assess fundus and lochia.- Provide ice/sitz baths for perineum discomfort.- Monitor healing of incision/episiotomy/laceration, and assess for signs and symptoms of infection and hematoma.- Assess bladder function and monitor for bladder distention.- Provide/instruct/assist with pericare as needed.- Provide VTE prophylaxis as needed.- Monitor bowel function.- Encourage ambulation and provide assistance as needed.- Assess and monitor emotional status and provide social service/psych resources as needed.- Utilize standard precautions and use personal protective equipment as indicated. Ensure aseptic care of all intravenous lines and invasive tubes/drains.- Obtain immunization and exposure to communicable diseases history.  Outcome: Adequate for Discharge  Goal: Optimize infant feeding at the breast  Description: INTERVENTIONS:- Initiate breast feeding within first hour after birth. - Monitor effectiveness of current breast feeding efforts.- Assess support systems available to mother/family.- Identify cultural beliefs/practices regarding lactation, letdown techniques, maternal food preferences.- Assess mother's knowledge and previous experience with breast feeding.- Provide information as needed about early infant feeding cues (e.g., rooting, lip smacking, sucking fingers/hand) versus late cue of crying.- Discuss/demonstrate breast feeding aids (e.g., infant sling, nursing footstool/pillows, and breast pumps).- Encourage mother/other family members to express feelings/concerns, and actively listen.- Educate father/SO about benefits of breast feeding and how to manage common lactation challenges.- Recommend avoidance of specific medications or substances incompatible with breast feeding.- Assess and monitor for signs of nipple pain/trauma.- Instruct and provide  assistance with proper latch.- Review techniques for milk expression (breast pumping) and storage of breast milk. Provide pumping equipment/supplies, instructions and assistance, as needed.- Encourage rooming-in and breast feeding on demand.- Encourage skin-to-skin contact.- Provide LC support as needed.- Assess for and manage engorgement.- Provide breast feeding education handouts and information on community breast feeding support.   Outcome: Adequate for Discharge  Goal: Establishment of adequate milk supply with medication/procedure interruptions  Description: INTERVENTIONS:- Review techniques for milk expression (breast pumping). - Provide pumping equipment/supplies, instructions, and assistance until it is safe to breastfeed infant.  Outcome: Adequate for Discharge  Goal: Appropriate maternal -  bonding  Description: INTERVENTIONS:- Assess caregiver- interactions.- Assess caregiver's emotional status and coping mechanisms.- Encourage caregiver to participate in  daily care.- Assess support systems available to mother/family.- Provide /case management support as needed.  Outcome: Adequate for Discharge

## 2025-04-25 NOTE — PROGRESS NOTES
Went over discharge paperwork with patient and . Patient understands she needs to follow up in 6 weeks for postpartum follow up. Went over signs and symptoms of preeclampsia- pt encouraged to call OB if experiencing any. Bands matched with infant.

## 2025-04-25 NOTE — PROGRESS NOTES
Labor Analgesia Follow Up Note    Patient underwent epidural anesthesia for labor analgesia,    Placenta Date/Time: 4/24/2025 12:06 AM    Delivery Date/Time:: 4/24/2025  12:02 AM    /74 (BP Location: Left arm)   Pulse 69   Temp 98.1 °F (36.7 °C) (Oral)   Resp 16   Ht 1.524 m (5')   Wt 70.3 kg (155 lb)   SpO2 100%   Breastfeeding Yes   BMI 30.27 kg/m²     Assessment:  Patient seen and no apparent anesthesia related complications.    Thank you for asking us to participate in the care of your patient.

## 2025-04-25 NOTE — PROGRESS NOTES
OB Progress Note PPD#1    S: Feels well. Ambulating, eating. Pain controlled. Moderate VB. Breastfeeding.  Voiding without difficulty, + flatus, +BM. Had a productive cough yesterday, did not continue.    O:  Blood pressure 107/74, pulse 69, temperature 98.1 °F (36.7 °C), temperature source Oral, resp. rate 16, height 5' (1.524 m), weight 155 lb (70.3 kg), SpO2 100%, currently breastfeeding.  Gen: NAD, AAOx3  Breasts: soft, nontender, nonerythematous  Abdomen: soft, nontender, nondistended, fundus firm and nontender  Gyne: moderate lochia  Ext: trace edema      Lab Results  Lab Results   Component Value Date    WBC 14.2 2025    HGB 9.0 2025    HCT 28.1 2025    .0 2025     Recent Labs   Lab 25  1717 25  1718   RBC 4.01 3.45*   HGB 10.4* 9.0*   HCT 32.1* 28.1*   MCV 80.0 81.4   MCH 25.9* 26.1   MCHC 32.4 32.0   RDW 15.2 15.6   NEPRELIM 8.26* 10.74*   WBC 12.1* 14.2*   .0 199.0           A/P: PPD#1 s/p , doing well  1. Continue pain control with motrin PRN  2. Breastfeeding   -- given encouragement and support   -- lactation consult PRN  3. Hgb stable at 9.0, asymptomatic  4. DVT ppx: ambulating  5. Circumcision desired, will do today    Discharge home today, instructions reviewed    Kylie Vick D.O.  [facility]  Contact via [employer] or cell

## 2025-04-27 ENCOUNTER — TELEPHONE (OUTPATIENT)
Dept: OBGYN UNIT | Facility: HOSPITAL | Age: 35
End: 2025-04-27

## 2025-04-27 NOTE — PROGRESS NOTES
Reviewed self and infant care w / mom, she verbalizes understanding of instructions reviewed. Encourage to follow up w/ MDs as directed and w/ questions/concerns. Pumping and  formula bc of jaundice, all care reviewed, ped appt tomorrow.